# Patient Record
Sex: MALE | Race: WHITE | NOT HISPANIC OR LATINO | URBAN - METROPOLITAN AREA
[De-identification: names, ages, dates, MRNs, and addresses within clinical notes are randomized per-mention and may not be internally consistent; named-entity substitution may affect disease eponyms.]

---

## 2017-10-05 ENCOUNTER — ALLSCRIPTS OFFICE VISIT (OUTPATIENT)
Dept: OTHER | Facility: OTHER | Age: 22
End: 2017-10-05

## 2018-01-10 NOTE — PROGRESS NOTES
Assessment    1  Encounter for annual physical exam (V70 0) (Z00 00)   2  Back pain (724 5) (M54 9)   3  Need for tetanus booster (V03 7) (Z23)   4  Need for immunization against influenza (V04 81) (Z23)    Plan  Back pain    · Call [phone] if: The pain seems worse ; Status:Complete;   Done: [id number]  03:23PM   · Call 911 if: You have any loss of bowel or bladder control ; Status:Complete;   Done:  92PYK3928 03:23PM   · Call 911 if: You have signs of dangerous pressure on the nerves in your pelvis ;  Status:Complete;   Done: 21VMQ6462 03:23PM   · Seek Immediate Medical Attention if: Your leg is numb, cold, or tingling ;  Status:Complete;   Done: [id number] 03:23PM   · Avoid prolonged standing in one place ; Status:Complete;   Done: 77JQV6310 03:23PM   · Use warm packs 4 times a day for 15 minutes ; Status:Complete;   Done: 42UKM6534  03:23PM   · We recommend that you stretch your lower back muscles lying down  Do this exercise 3  times in a row, 3 times a day , and hold the stretch for 20 seconds each  time ; Status:Complete;   Done: 10JKM0652 03:23PM   · Wear shoes that provide good support for your feet ; Status:Complete;   Done: 14SHI7471  03:23PM  Need for immunization against influenza    · Fluzone Quadrivalent Intramuscular Suspension; INJECT 0 5  ML  Intramuscular; To Be Done: [id number]  Need for tetanus booster    · Adacel 5-2-15 5 LF-MCG/0 5 Intramuscular Suspension; INJECT 0 5  ML  Intramuscular; To Be Done: [id number]    Discussion/Summary  Impression: healthy adult male  Currently, he eats an adequate diet and has an adequate exercise regimen  Prostate cancer screening: PSA is not indicated  Colorectal cancer screening: colorectal cancer screening is not indicated  The immunizations will be given as outlined in the orders  He was advised to be evaluated by an optometrist and a dentist  Advice and education were given regarding nutrition, aerobic exercise, sunscreen use and seat belt use   Patient discussion: discussed with the patient, discussed with the patient's family  LBP - reassured pt and Mom that no red flag symptoms and exam completely nml, no indication for imaging at this time as she was requesting Xray, advised avoid standing in one place for long periods of time, wear good supportive shoes at work and stand on mats when possible, urged stretches in between jobs and can use heat and Ibuprofen prn pain, if not better would benefit from PT eval, call with weakness/numbness/tingling/new or worsened pain  The patient, patient's family was counseled regarding instructions for management, risk factor reductions, prognosis, patient and family education, impressions, risks and benefits of treatment options, importance of compliance with treatment  Chief Complaint  PE      History of Present Illness  HM, Adult Male: The patient is being seen for a health maintenance evaluation  The last health maintenance visit was 2 year(s) ago  Social History: Household members include alone  He is unmarried  Work status: working full time and occupation:   The patient has never smoked cigarettes  He reports occasional alcohol use and drinking 1-2 drinks per week  Alcohol concern:  no personal concern about alcohol use and no family concern about alcohol use  He has never used illicit drugs  General Health: The patient's health since the last visit is described as good  He has regular dental visits  He denies vision problems  He denies hearing loss  Immunizations status: up to date   dentist about a year ago - has appt for 2 wks - has no current dental issues, pt is agreeable to flu vaccine, does think he needs a tetanus vaccine  Lifestyle:  He does not have any weight concerns  He does not use tobacco  He consumes alcohol  He denies drug use  well balanced diet with fruits and veggies, mountain bikes and hikes regularly  Reproductive health:  the patient is sexually active   uses contraception and denies concerns/symptoms of STD's  Screening: Prostate cancer screening includes no previous evaluation  Colorectal cancer screening includes no previous screening  Metabolic screening includes no previous lipid profile, no previous glucose screening and no previous thyroid function test      Cardiovascular risk factors: no hypertension and no tobacco use  General health risks: no family history of prostate cancer  Safety elements used: seat belt and sunscreen  Risk findings: no anxiety symptoms and no depression symptoms  HPI: Has been having some L central low back pain when he stands a lot at work  He is a  and stands a lot  He wears good supportive shoes and has mats to stand on  He does not stretch or sit much at work  He states the pain is achy and is intermittent  He states the pain is also worse with lifting  He notes no radiation of pain  He notes no LE weakness/numbness/tingling/loss of bowel or bladder control  He has tried to inserts in his boots which have helped  Review of Systems    Constitutional: no fever and no chills  Eyes: no eyesight problems  ENT: no sore throat and no nasal discharge  Cardiovascular: no chest pain and no palpitations  Respiratory: no shortness of breath, no cough and no wheezing  Gastrointestinal: no nausea, no vomiting and no diarrhea  Genitourinary: no dysuria  Musculoskeletal: arthralgias, but as noted in HPI  Integumentary: no rashes  Neurological: no headache and no dizziness  Psychiatric: no anxiety and no depression  Endocrine: no muscle weakness  Hematologic/Lymphatic: no tendency for easy bleeding and no tendency for easy bruising        Past Medical History    · Denied: History of Alcohol abuse   · Denied: History of depression   · Denied: History of substance abuse   · History of Sports physical (V70 3) (Z02 5)    Surgical History    · History of Dental Surgery    Family History  Mother    · Denied: Family history of Alcohol abuse   · Denied: Family history of depression   · Denied: Family history of substance abuse   · No pertinent family history  Father    · Denied: Family history of Alcohol abuse   · Denied: Family history of depression   · Denied: Family history of substance abuse   · No pertinent family history  Sibling    · Denied: Family history of Alcohol abuse   · Denied: Family history of depression   · Denied: Family history of substance abuse    Social History    · Full-time employment   · Never smoker   · Non drinker / no alcohol use    Current Meds   1  Ibuprofen 600 MG Oral Tablet; Take one tablet every 8 hours as needed for pain; Therapy: [id number] to Recorded    Allergies    1  No Known Drug Allergies    Vitals   Recorded: [id number] 02:50PM   Temperature 98 9 F   Heart Rate 68   Systolic 934   Diastolic 70   Height 5 ft 9 in   Weight 159 lb    BMI Calculated 23 48   BSA Calculated 1 87     Physical Exam    Constitutional   General appearance: No acute distress, well appearing and well nourished  Eyes   Conjunctiva and lids: No erythema, swelling or discharge  Pupils and irises: Equal, round, reactive to light  Ears, Nose, Mouth, and Throat   External inspection of ears and nose: Normal     Otoscopic examination: Tympanic membranes translucent with normal light reflex  Canals patent without erythema  Hearing: Normal     Lips, teeth, and gums: Normal, good dentition  Oropharynx: Normal with no erythema, edema, exudate or lesions  Neck   Neck: Supple, symmetric, trachea midline, no masses  Pulmonary   Respiratory effort: No increased work of breathing or signs of respiratory distress  Auscultation of lungs: Clear to auscultation  Cardiovascular   Auscultation of heart: Normal rate and rhythm, normal S1 and S2, no murmurs  Examination of extremities for edema and/or varicosities: Normal     Abdomen   Abdomen: Non-tender, no masses      Lymphatic   Palpation of lymph nodes in neck: No lymphadenopathy  Musculoskeletal   Gait and station: Normal     Inspection/palpation of joints, bones, and muscles: Normal     Range of motion: Normal   nml flex/ext/SB/rotation of cervical spine  Stability: Normal     Muscle strength/tone: Normal   5/5 B/L UE and LE muscle strenght, neg SLR test B/L LE  Skin   Skin and subcutaneous tissue: Normal without rashes or lesions  Neurologic   Reflexes: 2+ and symmetric  2/4 B/L bicep and patellar reflexes  Sensation: No sensory loss  Psychiatric   Judgment and insight: Normal     Mood and affect: Normal        Results/Data  PHQ-2 Adult Depression Screening [id number] 02:54PM User, s     Test Name Result Flag Reference   PHQ-2 Adult Depression Score 0     Over the last two weeks, how often have you been bothered by any of the following problems?   Little interest or pleasure in doing things: Not at all - 0  Feeling down, depressed, or hopeless: Not at all - 0   PHQ-2 Adult Depression Screening Negative         Signatures   Electronically signed by : Caren Mistry DO; Oct  5 2017  3:25PM EST                       (Author)

## 2018-01-14 VITALS
TEMPERATURE: 98.9 F | HEIGHT: 69 IN | DIASTOLIC BLOOD PRESSURE: 70 MMHG | WEIGHT: 159 LBS | SYSTOLIC BLOOD PRESSURE: 120 MMHG | HEART RATE: 68 BPM | BODY MASS INDEX: 23.55 KG/M2

## 2021-05-01 ENCOUNTER — IMMUNIZATIONS (OUTPATIENT)
Dept: FAMILY MEDICINE CLINIC | Facility: HOSPITAL | Age: 26
End: 2021-05-01

## 2021-05-01 DIAGNOSIS — Z23 ENCOUNTER FOR IMMUNIZATION: Primary | ICD-10-CM

## 2021-05-01 PROCEDURE — 91300 SARS-COV-2 / COVID-19 MRNA VACCINE (PFIZER-BIONTECH) 30 MCG: CPT

## 2021-05-01 PROCEDURE — 0001A SARS-COV-2 / COVID-19 MRNA VACCINE (PFIZER-BIONTECH) 30 MCG: CPT

## 2021-12-27 ENCOUNTER — IMMUNIZATIONS (OUTPATIENT)
Dept: FAMILY MEDICINE CLINIC | Facility: HOSPITAL | Age: 26
End: 2021-12-27

## 2021-12-27 DIAGNOSIS — Z23 ENCOUNTER FOR IMMUNIZATION: Primary | ICD-10-CM

## 2021-12-27 PROCEDURE — 0002A COVID-19 PFIZER VACC 0.3 ML: CPT

## 2021-12-27 PROCEDURE — 91300 COVID-19 PFIZER VACC 0.3 ML: CPT
